# Patient Record
Sex: FEMALE | Race: WHITE | NOT HISPANIC OR LATINO | Employment: FULL TIME | URBAN - METROPOLITAN AREA
[De-identification: names, ages, dates, MRNs, and addresses within clinical notes are randomized per-mention and may not be internally consistent; named-entity substitution may affect disease eponyms.]

---

## 2023-10-07 PROBLEM — S76.311A HAMSTRING STRAIN, RIGHT, INITIAL ENCOUNTER: Status: ACTIVE | Noted: 2023-10-07

## 2023-10-07 PROBLEM — S63.509A WRIST SPRAIN: Status: ACTIVE | Noted: 2023-10-07

## 2023-10-07 PROBLEM — M76.821 POSTERIOR TIBIAL TENDONITIS, RIGHT: Status: ACTIVE | Noted: 2023-10-07

## 2023-10-07 PROBLEM — M67.51 PLICA SYNDROME, RIGHT KNEE: Status: ACTIVE | Noted: 2023-10-07

## 2023-10-07 PROBLEM — M25.861 PATELLOFEMORAL DYSFUNCTION OF RIGHT KNEE: Status: ACTIVE | Noted: 2023-10-07

## 2023-10-07 PROBLEM — M72.2 PLANTAR FASCIITIS, LEFT: Status: ACTIVE | Noted: 2023-10-07

## 2023-10-07 PROBLEM — H52.10 MYOPIA: Status: ACTIVE | Noted: 2023-10-07

## 2023-10-07 PROBLEM — J02.0 STREP PHARYNGITIS: Status: ACTIVE | Noted: 2023-10-07

## 2023-10-07 PROBLEM — M25.519 SHOULDER PAIN: Status: ACTIVE | Noted: 2023-10-07

## 2023-10-07 PROBLEM — S93.401A RIGHT ANKLE SPRAIN: Status: ACTIVE | Noted: 2023-10-07

## 2023-10-07 PROBLEM — M25.569 KNEE PAIN: Status: ACTIVE | Noted: 2023-10-07

## 2023-10-07 PROBLEM — R00.2 PALPITATIONS: Status: ACTIVE | Noted: 2023-10-07

## 2023-10-07 RX ORDER — NAPROXEN 500 MG/1
500 TABLET ORAL EVERY 12 HOURS
COMMUNITY
Start: 2020-02-04

## 2023-10-07 RX ORDER — ETONOGESTREL 68 MG/1
IMPLANT SUBCUTANEOUS
COMMUNITY
Start: 2018-12-14

## 2023-10-09 ENCOUNTER — OFFICE VISIT (OUTPATIENT)
Dept: ORTHOPEDIC SURGERY | Facility: CLINIC | Age: 28
End: 2023-10-09
Payer: COMMERCIAL

## 2023-10-09 DIAGNOSIS — G89.29 CHRONIC WRIST PAIN, RIGHT: Primary | ICD-10-CM

## 2023-10-09 DIAGNOSIS — M25.531 CHRONIC WRIST PAIN, RIGHT: Primary | ICD-10-CM

## 2023-10-09 DIAGNOSIS — S63.501A SPRAIN OF RIGHT WRIST, INITIAL ENCOUNTER: ICD-10-CM

## 2023-10-09 PROCEDURE — 1036F TOBACCO NON-USER: CPT | Performed by: FAMILY MEDICINE

## 2023-10-09 PROCEDURE — 99213 OFFICE O/P EST LOW 20 MIN: CPT | Performed by: FAMILY MEDICINE

## 2023-10-09 ASSESSMENT — PAIN - FUNCTIONAL ASSESSMENT: PAIN_FUNCTIONAL_ASSESSMENT: 0-10

## 2023-10-09 NOTE — PROGRESS NOTES
Established Patient Follow-Up Visit    CC:   Chief Complaint   Patient presents with    Right Wrist - Follow-up     DOI: 7/22/23      Wrist Injury       HPI:  Chely is a 28 y.o. female with complaints of persistent swelling and fullness to the right wrist.  She is 2-1/2 months out from her initial injury.  She is utilizing a simple over-the-counter brace for some volleyball activities and when there is some pain and discomfort.  She states she really has really good wrist range of motion, and ever since her injury to the right wrist she has had decreased range of motion with wrist extension.  Wrist flexion she states is fine she has pain and discomfort when trying to push up on her hands like when in a push-up position.  She states she has no issue doing a push-up with her knuckles down with a essentially straight wrist.  She still has a lot of soft tissue swelling is unable to get her rings on the right hand.  She has had no increased swelling to her unaffected left wrist so she does not feel that it is weight or water related.          REVIEW OF SYSTEMS:  GENERAL: Negative for malaise, significant weight loss, fever  MUSCULOSKELETAL: See HPI  NEURO:  Negative for numbness / tingling       PHYSICAL EXAM:  -Neuro: Gross sensation intact to the upper extremities bilaterally.  -Extremity: Right wrist exam: On inspection there is some mild soft tissue swelling seen circumferentially about the hand wrist and digits.  She has no obvious crepitus here today.  No bony tenderness to the distal radius or ulna.  Very minimal discomfort at the snuffbox she has the ability to flex without any discomfort.  Wrist extension is limited by about 20 degrees compared to the contralateral limb.  She has a negative Finkelstein test.  Forearm compartment soft compressible.  Distal pulses and sensation are intact.   strength equal and symmetric and unaffected.    IMAGING: No new imaging today  XR wrist  Narrative: Interpreted By:   ARMANDO PENNINGTON MD  MRN: [id number]  Patient Name: NANDA ALCANTARA     STUDY:  WRIST COMPLT; MIN 3 VIEWS; Right;  8/14/2023 9:00 am     INDICATION:  pain  S63.509A: Wrist sprain.     COMPARISON:  None.     ACCESSION NUMBER(S):  [id number]     ORDERING CLINICIAN:  COLE BUDINSKY     FINDINGS:  No fracture or dislocation. Carpal bones are normally aligned. No  joint effusion.     Impression: No acute findings.        MACRO:  None      PROCEDURE: None    ASSESSMENT:   Follow-up visit for right wrist sprain contusion, persistent discomfort    PLAN: At this time we will have the patient utilize a course of occupational therapy as a home exercises have not been enough to provide her enough symptomatic relief.  We encouraged her to continue to wean from her bracing as much and able as tolerated.  We will obtain an MRI of the right wrist going forward to allow us to determine the next best step for treatment and therapy.  Hopeful that the occupational therapy will help with some of the range of motion and swelling.  We will rule out any occult fracture or ligamentous injury or tendinopathy related to her injury with the MRI.  We will consider steroid injection with ultrasound as indicated for persistent pain once we get the results of the MRI.  Orders Placed This Encounter    MR wrist right wo IV contrast    Referral to Occupational Therapy     Plan       At the conclusion of the visit there were no further questions by the patient/family regarding their plan of care.  Patient was instructed to call or return with any issues, questions, or concerns regarding their injury and/or treatment plan described above.     10/09/23 at 11:17 AM - Cole C Budinsky, MD    Office: [phone]    This note was prepared using voice recognition software.  The details of this note are correct and have been reviewed, and corrected to the best of my ability.  Some grammatical errors may persist related to the Dragon software.

## 2023-11-08 ENCOUNTER — APPOINTMENT (OUTPATIENT)
Dept: RADIOLOGY | Facility: CLINIC | Age: 28
End: 2023-11-08
Payer: COMMERCIAL